# Patient Record
Sex: FEMALE | Race: WHITE | Employment: FULL TIME | URBAN - METROPOLITAN AREA
[De-identification: names, ages, dates, MRNs, and addresses within clinical notes are randomized per-mention and may not be internally consistent; named-entity substitution may affect disease eponyms.]

---

## 2022-11-14 ENCOUNTER — OFFICE VISIT (OUTPATIENT)
Dept: FAMILY MEDICINE CLINIC | Age: 22
End: 2022-11-14

## 2022-11-14 VITALS
SYSTOLIC BLOOD PRESSURE: 118 MMHG | DIASTOLIC BLOOD PRESSURE: 70 MMHG | OXYGEN SATURATION: 99 % | HEIGHT: 62 IN | HEART RATE: 100 BPM | TEMPERATURE: 97.1 F | BODY MASS INDEX: 36.95 KG/M2 | WEIGHT: 200.8 LBS

## 2022-11-14 DIAGNOSIS — R11.2 NAUSEA AND VOMITING, UNSPECIFIED VOMITING TYPE: Primary | ICD-10-CM

## 2022-11-14 DIAGNOSIS — L20.9 ATOPIC DERMATITIS, UNSPECIFIED TYPE: ICD-10-CM

## 2022-11-14 PROCEDURE — 99213 OFFICE O/P EST LOW 20 MIN: CPT

## 2022-11-14 RX ORDER — ONDANSETRON 4 MG/1
4 TABLET, FILM COATED ORAL 3 TIMES DAILY PRN
Qty: 15 TABLET | Refills: 0 | Status: SHIPPED | OUTPATIENT
Start: 2022-11-14

## 2022-11-14 SDOH — ECONOMIC STABILITY: FOOD INSECURITY: WITHIN THE PAST 12 MONTHS, YOU WORRIED THAT YOUR FOOD WOULD RUN OUT BEFORE YOU GOT MONEY TO BUY MORE.: NEVER TRUE

## 2022-11-14 SDOH — ECONOMIC STABILITY: FOOD INSECURITY: WITHIN THE PAST 12 MONTHS, THE FOOD YOU BOUGHT JUST DIDN'T LAST AND YOU DIDN'T HAVE MONEY TO GET MORE.: NEVER TRUE

## 2022-11-14 ASSESSMENT — PATIENT HEALTH QUESTIONNAIRE - PHQ9
10. IF YOU CHECKED OFF ANY PROBLEMS, HOW DIFFICULT HAVE THESE PROBLEMS MADE IT FOR YOU TO DO YOUR WORK, TAKE CARE OF THINGS AT HOME, OR GET ALONG WITH OTHER PEOPLE: 0
SUM OF ALL RESPONSES TO PHQ QUESTIONS 1-9: 0
2. FEELING DOWN, DEPRESSED OR HOPELESS: 0
SUM OF ALL RESPONSES TO PHQ9 QUESTIONS 1 & 2: 0
3. TROUBLE FALLING OR STAYING ASLEEP: 0
4. FEELING TIRED OR HAVING LITTLE ENERGY: 0
8. MOVING OR SPEAKING SO SLOWLY THAT OTHER PEOPLE COULD HAVE NOTICED. OR THE OPPOSITE, BEING SO FIGETY OR RESTLESS THAT YOU HAVE BEEN MOVING AROUND A LOT MORE THAN USUAL: 0
SUM OF ALL RESPONSES TO PHQ QUESTIONS 1-9: 0
7. TROUBLE CONCENTRATING ON THINGS, SUCH AS READING THE NEWSPAPER OR WATCHING TELEVISION: 0
5. POOR APPETITE OR OVEREATING: 0
9. THOUGHTS THAT YOU WOULD BE BETTER OFF DEAD, OR OF HURTING YOURSELF: 0
1. LITTLE INTEREST OR PLEASURE IN DOING THINGS: 0
6. FEELING BAD ABOUT YOURSELF - OR THAT YOU ARE A FAILURE OR HAVE LET YOURSELF OR YOUR FAMILY DOWN: 0

## 2022-11-14 ASSESSMENT — ENCOUNTER SYMPTOMS
VOMITING: 1
BLOOD IN STOOL: 0
DIARRHEA: 1
COUGH: 0
CHEST TIGHTNESS: 0
SORE THROAT: 0
FACIAL SWELLING: 0
WHEEZING: 0
ABDOMINAL PAIN: 1
SHORTNESS OF BREATH: 0
NAUSEA: 1

## 2022-11-14 ASSESSMENT — SOCIAL DETERMINANTS OF HEALTH (SDOH): HOW HARD IS IT FOR YOU TO PAY FOR THE VERY BASICS LIKE FOOD, HOUSING, MEDICAL CARE, AND HEATING?: NOT HARD AT ALL

## 2022-11-14 NOTE — PROGRESS NOTES
Subjective  Estrellita Fox, 23 y.o. female presents today with:  Chief Complaint   Patient presents with    Nausea & Vomiting     Pt states since yesterday pt has been throwing up has been feeling dizzy, nauseas, body aches    Rash     Pt states has been having a rash x3 weeks on her chest going to her back pt states rash is itchy on and off       Nausea & Vomiting  This is a new problem. Episode onset: Vomiting and diarrhea began yesterday. The problem occurs intermittently. The problem has been waxing and waning. Associated symptoms include abdominal pain, nausea, a rash and vomiting. Pertinent negatives include no arthralgias, chest pain, chills, congestion, coughing, diaphoresis, fatigue, fever, headaches, myalgias, neck pain, sore throat or weakness. Nothing aggravates the symptoms. She has tried rest and drinking for the symptoms. The treatment provided no relief. Rash  This is a new problem. The current episode started 1 to 4 weeks ago (Symptoms began 3 weeks ago. ). The problem has been waxing and waning since onset. The affected locations include the back, chest and neck. The rash is characterized by itchiness, redness and scaling. Associated with: possible mold exposure in apartment. Associated symptoms include diarrhea and vomiting. Pertinent negatives include no congestion, cough, fatigue, fever, shortness of breath or sore throat. Treatments tried: calamine. The treatment provided no relief. There is no history of allergies, asthma or eczema. Rash began on neck and has spread to chest and upper back. Review of Systems   Constitutional:  Negative for chills, diaphoresis, fatigue and fever. HENT:  Negative for congestion, facial swelling and sore throat. Respiratory:  Negative for cough, chest tightness, shortness of breath and wheezing. Cardiovascular:  Positive for palpitations (chronic). Negative for chest pain.    Gastrointestinal:  Positive for abdominal pain, diarrhea, nausea and vomiting. Negative for blood in stool. Musculoskeletal:  Negative for arthralgias, myalgias and neck pain. Skin:  Positive for rash. Negative for pallor and wound. Neurological:  Negative for weakness and headaches. PMH, Surgical Hx, Family Hx, and Social Hx reviewed and updated. Objective  Vitals:    11/14/22 1151 11/14/22 1237   BP: 118/70    Site: Right Upper Arm    Position: Sitting    Cuff Size: Large Adult    Pulse: (!) 122 100   Temp: 97.1 °F (36.2 °C)    TempSrc: Temporal    SpO2: 99%    Weight: 200 lb 12.8 oz (91.1 kg)    Height: 5' 2\" (1.575 m)      BP Readings from Last 3 Encounters:   11/14/22 118/70     Wt Readings from Last 3 Encounters:   11/14/22 200 lb 12.8 oz (91.1 kg)     Physical Exam  Vitals reviewed. Constitutional:       General: She is not in acute distress. Appearance: Normal appearance. HENT:      Head: Normocephalic and atraumatic. Eyes:      General: Lids are normal.   Cardiovascular:      Rate and Rhythm: Normal rate and regular rhythm. Pulses: Normal pulses. Heart sounds: Normal heart sounds. Pulmonary:      Effort: Pulmonary effort is normal. No respiratory distress. Breath sounds: Normal breath sounds and air entry. Abdominal:      General: Abdomen is flat. Bowel sounds are normal.      Palpations: Abdomen is soft. Tenderness: There is no abdominal tenderness. There is no guarding or rebound. Musculoskeletal:      Cervical back: Normal range of motion. Skin:     General: Skin is warm and dry. Findings: Erythema, lesion and rash present. No abrasion, abscess, acne, burn, ecchymosis or signs of injury. Rash is papular and scaling. Rash is not crusting or urticarial.             Comments: Diffuse erythematous rash with some areas of scaling. Sometimes pruritic. Neurological:      General: No focal deficit present. Mental Status: She is alert and oriented to person, place, and time. Mental status is at baseline. Psychiatric:         Mood and Affect: Mood normal.     Assessment & Plan   1. Nausea and vomiting, unspecified vomiting type  -     ondansetron (ZOFRAN) 4 MG tablet; Take 1 tablet by mouth 3 times daily as needed for Nausea or Vomiting, Disp-15 tablet, R-0Normal       -Hydration       -Eat small, bland meals as tolerated        -Discussed red flags for when to go to ER  2. Atopic dermatitis, unspecified type  -     hydrocortisone 2.5 % cream; Apply topically 2 times daily for 7 days Apply topically 2 times daily. , Topical, 2 TIMES DAILY Starting Mon 11/14/2022, Until Mon 11/21/2022, For 7 days, Disp-1 each, R-0, Normal  -Avoid scratching rash  -Use gentle soaps and detergent to avoid triggers  -Follow up with PCP for any signs of infection or if rash is worsening or not improving with treatment  -Discussed red flags for when to seek care in ER       No orders of the defined types were placed in this encounter. Orders Placed This Encounter   Medications    ondansetron (ZOFRAN) 4 MG tablet     Sig: Take 1 tablet by mouth 3 times daily as needed for Nausea or Vomiting     Dispense:  15 tablet     Refill:  0    hydrocortisone 2.5 % cream     Sig: Apply topically 2 times daily for 7 days Apply topically 2 times daily. Dispense:  1 each     Refill:  0     Return if symptoms worsen or fail to improve, for follow up with PCP. Reviewed with the patient: current clinical status,medications, activities and diet. Side effects, adverse effects of themedication prescribed today, as well as treatment plan/ rationale and result expectations have been discussed with the patient who expresses understanding and desires to proceed. Close follow up to evaluate treatment results and for coordination of care. I have reviewed the patient's medical history in detail and updated the computerized patient record.     DILCIA Mcdonnell - NP

## 2022-11-14 NOTE — LETTER
[address]  Phone: [phone]  Fax: [phone]      DILCIA Johnson NP    November 14, 2022     Patient: José Luis Zee   Date of Visit: 11/14/2022       To Whom it May Concern:    José Luis Zee was evaluated in my clinic today and may return to work when symptoms are improving and 24 hours fever free without the use of fever-reducing medication. If there are questions or concerns, please have the patient contact our office. Thank you for your assistance in this matter.       Sincerely,        Electronically signed by DILCIA Johnson NP on 11/14/2022 at 12:49 PM

## 2022-12-18 ENCOUNTER — OFFICE VISIT (OUTPATIENT)
Dept: FAMILY MEDICINE CLINIC | Age: 22
End: 2022-12-18

## 2022-12-18 VITALS
HEART RATE: 86 BPM | WEIGHT: 200.6 LBS | DIASTOLIC BLOOD PRESSURE: 72 MMHG | OXYGEN SATURATION: 98 % | SYSTOLIC BLOOD PRESSURE: 118 MMHG | TEMPERATURE: 96.9 F | HEIGHT: 62 IN | BODY MASS INDEX: 36.91 KG/M2

## 2022-12-18 DIAGNOSIS — S16.1XXA STRAIN OF NECK MUSCLE, INITIAL ENCOUNTER: Primary | ICD-10-CM

## 2022-12-18 RX ORDER — IBUPROFEN 600 MG/1
600 TABLET ORAL 3 TIMES DAILY PRN
Qty: 30 TABLET | Refills: 0 | Status: SHIPPED | OUTPATIENT
Start: 2022-12-18

## 2022-12-18 RX ORDER — CYCLOBENZAPRINE HCL 10 MG
10 TABLET ORAL 3 TIMES DAILY PRN
Qty: 30 TABLET | Refills: 0 | Status: SHIPPED | OUTPATIENT
Start: 2022-12-18 | End: 2022-12-28

## 2022-12-18 ASSESSMENT — ENCOUNTER SYMPTOMS
BACK PAIN: 0
RESPIRATORY NEGATIVE: 1
COLOR CHANGE: 0

## 2022-12-18 NOTE — LETTER
[address]  Phone: [phone]  Fax: [phone]    DILCIA De Paz CNP        December 18, 2022     Patient: Nadya Mejia   YOB: 2000   Date of Visit: 12/18/2022       To Whom It May Concern: It is my medical opinion that Nadya Mejia may return to work on 12/20/2022. If you have any questions or concerns, please don't hesitate to call.     Sincerely,        DILCIA De Paz CNP

## 2022-12-18 NOTE — PROGRESS NOTES
[facility] [address] Encounter        ASSESSMENT/PLAN     Adrian Mobley is a 23 y.o. female who presents with:  Neck pain and stiffness starting last night. Patient reports she cannot straighten head to normal position. Reports fall 2 days ago. She did not notice pain at the time of fall. On examination patient has limited range of motion to the left into the right. She denies cervical tenderness. There is no bruising abrasions on her head or neck. Neck is supple there are no masses detected. Reports warm heat applied to the neck last night was helpful in improving symptoms      1. Strain of neck muscle, initial encounter      Advised patient continue with applying warm heat to help relax muscle. Orders for Flexeril for muscle relaxation and ibuprofen for pain and inflammation. Advised patient to rest gradually increase range of motion as tolerated. PATIENT REFERRED TO:  Return if symptoms worsen or fail to improve. DISCHARGE MEDICATIONS:  New Prescriptions    CYCLOBENZAPRINE (FLEXERIL) 10 MG TABLET    Take 1 tablet by mouth 3 times daily as needed for Muscle spasms    IBUPROFEN (ADVIL;MOTRIN) 600 MG TABLET    Take 1 tablet by mouth 3 times daily as needed for Pain     Cannot display discharge medications since this is not an admission. Fernanda Ortiz, DILCIA - CNP    CHIEF COMPLAINT       Chief Complaint   Patient presents with    Neck Pain     Pt states since last neck was sore today worse painful to move          SUBJECTIVE/REVIEW OF SYSTEMS     Review of Systems   Constitutional:  Negative for chills, fatigue and fever. Respiratory: Negative. Cardiovascular: Negative. Genitourinary: Negative. Musculoskeletal:  Positive for arthralgias, myalgias, neck pain and neck stiffness. Negative for back pain. Skin:  Negative for color change and wound. Neurological:  Negative for weakness and numbness. Hematological:  Negative for adenopathy.  Does not bruise/bleed easily. Psychiatric/Behavioral:  Negative for agitation. OBJECTIVE/PHYSICAL EXAM     Physical Exam  Vitals reviewed. Constitutional:       General: She is not in acute distress. Appearance: Normal appearance. Cardiovascular:      Rate and Rhythm: Normal rate. Pulses: Normal pulses. Pulmonary:      Effort: Pulmonary effort is normal. No respiratory distress. Musculoskeletal:         General: Tenderness present. No swelling, deformity or signs of injury. Normal range of motion. Left shoulder: Tenderness present. No bony tenderness. Arms:    Skin:     General: Skin is warm and dry. Capillary Refill: Capillary refill takes less than 2 seconds. Findings: No bruising, erythema, lesion or rash. Neurological:      Mental Status: She is alert and oriented to person, place, and time. Mental status is at baseline. Sensory: No sensory deficit. Motor: No weakness. Psychiatric:         Mood and Affect: Mood normal.       VITALS  BP: 118/72, Temp: 96.9 °F (36.1 °C), Temp Source: Temporal, Heart Rate: 86,  , SpO2: 98 %      PAST MEDICAL HISTORY   History reviewed. No pertinent past medical history. SURGICAL HISTORY     Patient  has no past surgical history on file. CURRENT MEDICATIONS       Previous Medications    ONDANSETRON (ZOFRAN) 4 MG TABLET    Take 1 tablet by mouth 3 times daily as needed for Nausea or Vomiting     ALLERGIES     Patient is is allergic to sulfate. FAMILY HISTORY     Patient'sfamily history is not on file. HISTORY     Patient  reports that she has never smoked. She has never used smokeless tobacco. She reports that she does not drink alcohol and does not use drugs. READY CARE COURSE   No orders of the defined types were placed in this encounter. Labs:  No results found for this visit on 12/18/22. IMAGING:  No orders to display     Scheduled Meds:  Continuous Infusions:  PRN Meds:.

## 2022-12-18 NOTE — PATIENT INSTRUCTIONS
Use warm compress to help relax neck muscle. As tolerated light range of motion activities try to keep good alignment in your neck.   Gradually increase activity

## 2023-01-10 SDOH — HEALTH STABILITY: PHYSICAL HEALTH: ON AVERAGE, HOW MANY MINUTES DO YOU ENGAGE IN EXERCISE AT THIS LEVEL?: 120 MIN

## 2023-01-10 SDOH — HEALTH STABILITY: PHYSICAL HEALTH: ON AVERAGE, HOW MANY DAYS PER WEEK DO YOU ENGAGE IN MODERATE TO STRENUOUS EXERCISE (LIKE A BRISK WALK)?: 5 DAYS

## 2023-01-10 ASSESSMENT — SOCIAL DETERMINANTS OF HEALTH (SDOH)
WITHIN THE LAST YEAR, HAVE YOU BEEN HUMILIATED OR EMOTIONALLY ABUSED IN OTHER WAYS BY YOUR PARTNER OR EX-PARTNER?: NO
WITHIN THE LAST YEAR, HAVE YOU BEEN KICKED, HIT, SLAPPED, OR OTHERWISE PHYSICALLY HURT BY YOUR PARTNER OR EX-PARTNER?: NO
WITHIN THE LAST YEAR, HAVE YOU BEEN AFRAID OF YOUR PARTNER OR EX-PARTNER?: NO
WITHIN THE LAST YEAR, HAVE TO BEEN RAPED OR FORCED TO HAVE ANY KIND OF SEXUAL ACTIVITY BY YOUR PARTNER OR EX-PARTNER?: NO

## 2023-01-11 ENCOUNTER — OFFICE VISIT (OUTPATIENT)
Dept: FAMILY MEDICINE CLINIC | Age: 23
End: 2023-01-11

## 2023-01-11 VITALS
BODY MASS INDEX: 36.8 KG/M2 | OXYGEN SATURATION: 98 % | WEIGHT: 200 LBS | HEART RATE: 86 BPM | SYSTOLIC BLOOD PRESSURE: 96 MMHG | DIASTOLIC BLOOD PRESSURE: 72 MMHG | HEIGHT: 62 IN

## 2023-01-11 DIAGNOSIS — F43.10 PTSD (POST-TRAUMATIC STRESS DISORDER): ICD-10-CM

## 2023-01-11 DIAGNOSIS — Z00.00 WELL ADULT HEALTH CHECK: Primary | ICD-10-CM

## 2023-01-11 DIAGNOSIS — Z80.8 FAMILY HISTORY OF MELANOMA: ICD-10-CM

## 2023-01-11 ASSESSMENT — PATIENT HEALTH QUESTIONNAIRE - PHQ9
SUM OF ALL RESPONSES TO PHQ QUESTIONS 1-9: 0
2. FEELING DOWN, DEPRESSED OR HOPELESS: 0
1. LITTLE INTEREST OR PLEASURE IN DOING THINGS: 0
SUM OF ALL RESPONSES TO PHQ9 QUESTIONS 1 & 2: 0

## 2023-01-11 ASSESSMENT — ENCOUNTER SYMPTOMS
COUGH: 0
DIARRHEA: 0
CONSTIPATION: 0
SHORTNESS OF BREATH: 0

## 2023-01-11 NOTE — PROGRESS NOTES
Subjective  Chief Complaint   Patient presents with    Establish Care       HPI    Here to establish care  Has not had medical insurance for some time. Has hx of recurrent MRSA. Has some hx of palpitations at times. Seems to be doing better than it was. Had holter monitor about 3-4 yrs ago. Would like referral to a counselor    No other current concerns       There are no problems to display for this patient. Past Medical History:   Diagnosis Date    Anxiety     Depression      No past surgical history on file. Family History   Problem Relation Age of Onset    Cancer Mother         melanoma    Arthritis Mother     Cancer Maternal Aunt     Stroke Maternal Grandmother     Diabetes Maternal Grandmother     Cancer Maternal Grandmother         brain     Social History     Socioeconomic History    Marital status: Single     Spouse name: None    Number of children: None    Years of education: None    Highest education level: None   Tobacco Use    Smoking status: Never    Smokeless tobacco: Never   Substance and Sexual Activity    Alcohol use: Never    Drug use: Never     Social Determinants of Health     Financial Resource Strain: Low Risk     Difficulty of Paying Living Expenses: Not hard at all   Food Insecurity: No Food Insecurity    Worried About Running Out of Food in the Last Year: Never true    Ran Out of Food in the Last Year: Never true   Physical Activity: Sufficiently Active    Days of Exercise per Week: 5 days    Minutes of Exercise per Session: 120 min   Intimate Partner Violence: Not At Risk    Fear of Current or Ex-Partner: No    Emotionally Abused: No    Physically Abused: No    Sexually Abused: No     No current outpatient medications on file prior to visit. No current facility-administered medications on file prior to visit. Allergies   Allergen Reactions    Sulfa Antibiotics        Review of Systems   Constitutional:  Negative for fatigue.    Respiratory:  Negative for cough and shortness of breath. Cardiovascular:  Negative for chest pain. Gastrointestinal:  Negative for constipation and diarrhea. Psychiatric/Behavioral:  The patient is nervous/anxious. Objective  Vitals:    01/11/23 1019   BP: 96/72   Pulse: 86   SpO2: 98%   Weight: 200 lb (90.7 kg)   Height: 5' 2\" (1.575 m)     Physical Exam  Vitals and nursing note reviewed. Constitutional:       Appearance: Normal appearance. HENT:      Head: Normocephalic. Nose: Nose normal.      Mouth/Throat:      Mouth: Mucous membranes are moist.      Pharynx: Oropharynx is clear. Eyes:      Extraocular Movements: Extraocular movements intact. Conjunctiva/sclera: Conjunctivae normal.      Pupils: Pupils are equal, round, and reactive to light. Cardiovascular:      Rate and Rhythm: Normal rate and regular rhythm. Pulses: Normal pulses. Heart sounds: Normal heart sounds. Pulmonary:      Effort: Pulmonary effort is normal.      Breath sounds: Normal breath sounds. Musculoskeletal:      Cervical back: Neck supple. Skin:     General: Skin is warm. Neurological:      General: No focal deficit present. Mental Status: She is alert and oriented to person, place, and time. Mental status is at baseline. Psychiatric:         Mood and Affect: Mood normal.         Behavior: Behavior normal.         Thought Content: Thought content normal.         Judgment: Judgment normal.         Assessment & Plan     Diagnosis Orders   1. Well adult health check        2. PTSD (post-traumatic stress disorder)  [address]      3. Family history of melanoma  Discussed importance of getting annual skin check due to sig fam hx. Orders Placed This Encounter   Procedures    [facility], [facility]     Referral Priority:   Routine     Referral Type:   Eval and Treat     Referral Reason:   Specialty Services Required     Referred to Provider:   Jorge Luis Durant [facility]     Requested Specialty:   Licensed Clinical      Number of Visits Requested:   1       Side effects, adverse effects of the medication prescribed today, as well as treatment plan/ rationale and result expectations have been discussed with the patient who expresses understanding and desires to proceed. Close follow up to evaluate treatment results and for coordination of care. I have reviewed the patient's medical history in detail and updated the computerized patient record. As always, patient is advised that if symptoms worsen in any way they will proceed to the nearest emergency room. GAVINO jeann.     DILCIA Byrd - CNP

## 2024-05-22 ENCOUNTER — APPOINTMENT (OUTPATIENT)
Dept: PRIMARY CARE | Facility: CLINIC | Age: 24
End: 2024-05-22
Payer: COMMERCIAL

## 2024-06-29 ENCOUNTER — HOSPITAL ENCOUNTER (OUTPATIENT)
Dept: RADIOLOGY | Facility: EXTERNAL LOCATION | Age: 24
Discharge: HOME | End: 2024-06-29
Payer: COMMERCIAL

## 2024-06-29 DIAGNOSIS — M25.511 RIGHT SHOULDER PAIN, UNSPECIFIED CHRONICITY: ICD-10-CM

## 2025-06-11 ENCOUNTER — OFFICE VISIT (OUTPATIENT)
Dept: URGENT CARE | Age: 25
End: 2025-06-11
Payer: COMMERCIAL

## 2025-06-11 ENCOUNTER — APPOINTMENT (OUTPATIENT)
Dept: RADIOLOGY | Facility: HOSPITAL | Age: 25
End: 2025-06-11
Payer: COMMERCIAL

## 2025-06-11 ENCOUNTER — APPOINTMENT (OUTPATIENT)
Dept: CARDIOLOGY | Facility: HOSPITAL | Age: 25
End: 2025-06-11
Payer: COMMERCIAL

## 2025-06-11 ENCOUNTER — HOSPITAL ENCOUNTER (EMERGENCY)
Facility: HOSPITAL | Age: 25
Discharge: HOME | End: 2025-06-11
Payer: COMMERCIAL

## 2025-06-11 VITALS
TEMPERATURE: 97.7 F | SYSTOLIC BLOOD PRESSURE: 153 MMHG | RESPIRATION RATE: 16 BRPM | BODY MASS INDEX: 34.04 KG/M2 | OXYGEN SATURATION: 98 % | HEART RATE: 111 BPM | WEIGHT: 185 LBS | DIASTOLIC BLOOD PRESSURE: 96 MMHG | HEIGHT: 62 IN

## 2025-06-11 VITALS
HEIGHT: 62 IN | RESPIRATION RATE: 15 BRPM | SYSTOLIC BLOOD PRESSURE: 125 MMHG | DIASTOLIC BLOOD PRESSURE: 80 MMHG | WEIGHT: 197.6 LBS | OXYGEN SATURATION: 100 % | TEMPERATURE: 97.9 F | BODY MASS INDEX: 36.36 KG/M2 | HEART RATE: 83 BPM

## 2025-06-11 DIAGNOSIS — R07.9 CHEST PAIN, UNSPECIFIED TYPE: ICD-10-CM

## 2025-06-11 DIAGNOSIS — R07.9 CHEST PAIN, UNSPECIFIED TYPE: Primary | ICD-10-CM

## 2025-06-11 DIAGNOSIS — R42 DIZZINESS: ICD-10-CM

## 2025-06-11 LAB
ALBUMIN SERPL BCP-MCNC: 4.4 G/DL (ref 3.4–5)
ALP SERPL-CCNC: 48 U/L (ref 33–110)
ALT SERPL W P-5'-P-CCNC: 14 U/L (ref 7–45)
ANION GAP SERPL CALCULATED.3IONS-SCNC: 12 MMOL/L (ref 10–20)
AST SERPL W P-5'-P-CCNC: 15 U/L (ref 9–39)
BASOPHILS # BLD AUTO: 0.07 X10*3/UL (ref 0–0.1)
BASOPHILS NFR BLD AUTO: 0.6 %
BILIRUB SERPL-MCNC: 0.3 MG/DL (ref 0–1.2)
BUN SERPL-MCNC: 12 MG/DL (ref 6–23)
CALCIUM SERPL-MCNC: 9.2 MG/DL (ref 8.6–10.3)
CARDIAC TROPONIN I PNL SERPL HS: <3 NG/L (ref 0–13)
CHLORIDE SERPL-SCNC: 104 MMOL/L (ref 98–107)
CO2 SERPL-SCNC: 27 MMOL/L (ref 21–32)
CREAT SERPL-MCNC: 0.68 MG/DL (ref 0.5–1.05)
EGFRCR SERPLBLD CKD-EPI 2021: >90 ML/MIN/1.73M*2
EOSINOPHIL # BLD AUTO: 0.11 X10*3/UL (ref 0–0.7)
EOSINOPHIL NFR BLD AUTO: 1 %
ERYTHROCYTE [DISTWIDTH] IN BLOOD BY AUTOMATED COUNT: 14.3 % (ref 11.5–14.5)
GLUCOSE SERPL-MCNC: 89 MG/DL (ref 74–99)
HCT VFR BLD AUTO: 37.6 % (ref 36–46)
HGB BLD-MCNC: 11.8 G/DL (ref 12–16)
IMM GRANULOCYTES # BLD AUTO: 0.03 X10*3/UL (ref 0–0.7)
IMM GRANULOCYTES NFR BLD AUTO: 0.3 % (ref 0–0.9)
LYMPHOCYTES # BLD AUTO: 2.28 X10*3/UL (ref 1.2–4.8)
LYMPHOCYTES NFR BLD AUTO: 20.9 %
MAGNESIUM SERPL-MCNC: 2.1 MG/DL (ref 1.6–2.4)
MCH RBC QN AUTO: 25.9 PG (ref 26–34)
MCHC RBC AUTO-ENTMCNC: 31.4 G/DL (ref 32–36)
MCV RBC AUTO: 83 FL (ref 80–100)
MONOCYTES # BLD AUTO: 0.9 X10*3/UL (ref 0.1–1)
MONOCYTES NFR BLD AUTO: 8.2 %
NEUTROPHILS # BLD AUTO: 7.54 X10*3/UL (ref 1.2–7.7)
NEUTROPHILS NFR BLD AUTO: 69 %
NRBC BLD-RTO: 0 /100 WBCS (ref 0–0)
PLATELET # BLD AUTO: 346 X10*3/UL (ref 150–450)
POTASSIUM SERPL-SCNC: 3.8 MMOL/L (ref 3.5–5.3)
PROT SERPL-MCNC: 7.3 G/DL (ref 6.4–8.2)
RBC # BLD AUTO: 4.56 X10*6/UL (ref 4–5.2)
SODIUM SERPL-SCNC: 139 MMOL/L (ref 136–145)
WBC # BLD AUTO: 10.9 X10*3/UL (ref 4.4–11.3)

## 2025-06-11 PROCEDURE — 96374 THER/PROPH/DIAG INJ IV PUSH: CPT

## 2025-06-11 PROCEDURE — 83735 ASSAY OF MAGNESIUM: CPT

## 2025-06-11 PROCEDURE — 80053 COMPREHEN METABOLIC PANEL: CPT

## 2025-06-11 PROCEDURE — 85025 COMPLETE CBC W/AUTO DIFF WBC: CPT

## 2025-06-11 PROCEDURE — 2500000004 HC RX 250 GENERAL PHARMACY W/ HCPCS (ALT 636 FOR OP/ED): Mod: JZ

## 2025-06-11 PROCEDURE — 93005 ELECTROCARDIOGRAM TRACING: CPT

## 2025-06-11 PROCEDURE — 71045 X-RAY EXAM CHEST 1 VIEW: CPT | Performed by: RADIOLOGY

## 2025-06-11 PROCEDURE — 84484 ASSAY OF TROPONIN QUANT: CPT

## 2025-06-11 PROCEDURE — 99285 EMERGENCY DEPT VISIT HI MDM: CPT | Mod: 25

## 2025-06-11 PROCEDURE — 71045 X-RAY EXAM CHEST 1 VIEW: CPT

## 2025-06-11 PROCEDURE — 36415 COLL VENOUS BLD VENIPUNCTURE: CPT

## 2025-06-11 RX ORDER — KETOROLAC TROMETHAMINE 15 MG/ML
15 INJECTION, SOLUTION INTRAMUSCULAR; INTRAVENOUS ONCE
Status: COMPLETED | OUTPATIENT
Start: 2025-06-11 | End: 2025-06-11

## 2025-06-11 RX ADMIN — KETOROLAC TROMETHAMINE 15 MG: 15 INJECTION, SOLUTION INTRAMUSCULAR; INTRAVENOUS at 20:51

## 2025-06-11 ASSESSMENT — PATIENT HEALTH QUESTIONNAIRE - PHQ9
2. FEELING DOWN, DEPRESSED OR HOPELESS: NOT AT ALL
1. LITTLE INTEREST OR PLEASURE IN DOING THINGS: NOT AT ALL
SUM OF ALL RESPONSES TO PHQ9 QUESTIONS 1 & 2: 0

## 2025-06-11 ASSESSMENT — PAIN SCALES - GENERAL
PAINLEVEL_OUTOF10: 4
PAINLEVEL_OUTOF10: 7
PAINLEVEL_OUTOF10: 2

## 2025-06-11 ASSESSMENT — HEART SCORE
RISK FACTORS: NO KNOWN RISK FACTORS
HISTORY: SLIGHTLY SUSPICIOUS
AGE: <45
ECG: NORMAL
TROPONIN: LESS THAN OR EQUAL TO NORMAL LIMIT
HEART SCORE: 0

## 2025-06-11 ASSESSMENT — PAIN DESCRIPTION - LOCATION: LOCATION: CHEST

## 2025-06-11 ASSESSMENT — PAIN DESCRIPTION - DESCRIPTORS: DESCRIPTORS: DULL

## 2025-06-11 ASSESSMENT — PAIN - FUNCTIONAL ASSESSMENT: PAIN_FUNCTIONAL_ASSESSMENT: 0-10

## 2025-06-11 NOTE — Clinical Note
Stefania Belcher was seen and treated in our emergency department on 6/11/2025.  She may return to work on 06/13/2025.       If you have any questions or concerns, please don't hesitate to call.      Madie Mancia PA-C

## 2025-06-11 NOTE — PROGRESS NOTES
"Subjective   Patient ID: Stefania Belcher is a 24 y.o. female. They present today with a chief complaint of Chest Pain (PT states she had severe LEFT sided chest pain that started this evening around 4pm while she was at work. PT states the pain was so severe, it made her fall down. PT states pain is sharp and then achey.).    History of Present Illness  See mdm       History provided by:  Patient   used: No        Past Medical History  Allergies as of 06/11/2025 - Reviewed 06/11/2025   Allergen Reaction Noted    Sulfa (sulfonamide antibiotics) Other 01/11/2023    Doxycycline monohydrate Rash 06/11/2025    Sulfamethoxazole-trimethoprim Rash 06/11/2025       Prescriptions Prior to Admission[1]     Medical History[2]    Surgical History[3]     reports that she has never smoked. She has never used smokeless tobacco. She reports current drug use. Drug: Marijuana. She reports that she does not drink alcohol.    Review of Systems  Review of Systems   All other systems reviewed and are negative.                                 Objective    Vitals:    06/11/25 1958   BP: (!) 153/96   Pulse: (!) 111   Resp: 16   Temp: 36.5 °C (97.7 °F)   TempSrc: Oral   SpO2: 98%   Weight: 83.9 kg (185 lb)   Height: 1.575 m (5' 2\")     Patient's last menstrual period was 05/21/2025 (approximate).    Physical Exam  Vitals and nursing note reviewed.   Constitutional:       General: She is not in acute distress.     Appearance: Normal appearance. She is normal weight. She is not toxic-appearing or diaphoretic.      Comments: Tearful    HENT:      Head: Normocephalic and atraumatic.      Mouth/Throat:      Mouth: Mucous membranes are moist.   Eyes:      General: No scleral icterus.        Right eye: No discharge.         Left eye: No discharge.      Extraocular Movements: Extraocular movements intact.      Conjunctiva/sclera: Conjunctivae normal.      Pupils: Pupils are equal, round, and reactive to light.   Cardiovascular: "      Rate and Rhythm: Regular rhythm. Tachycardia present.      Pulses: Normal pulses.      Heart sounds: Normal heart sounds. No murmur heard.     No friction rub. No gallop.   Pulmonary:      Effort: Pulmonary effort is normal. No respiratory distress.      Breath sounds: No wheezing, rhonchi or rales.   Abdominal:      General: Abdomen is flat.      Tenderness: There is no abdominal tenderness. There is no guarding or rebound.   Musculoskeletal:         General: Normal range of motion.      Cervical back: Normal range of motion and neck supple. No rigidity or tenderness.      Right lower leg: No edema.      Left lower leg: No edema.   Lymphadenopathy:      Cervical: No cervical adenopathy.   Skin:     General: Skin is warm and dry.      Capillary Refill: Capillary refill takes less than 2 seconds.      Coloration: Skin is not jaundiced or pale.      Findings: No rash.   Neurological:      General: No focal deficit present.      Mental Status: She is alert.      Gait: Gait normal.   Psychiatric:         Mood and Affect: Mood normal.         Behavior: Behavior normal.         Procedures    Point of Care Test & Imaging Results from this visit  Results for orders placed or performed in visit on 06/11/25   POCT fingerstick glucose manually resulted   Result Value Ref Range    POC Fingerstick Blood Glucose 113 (A) 70 - 100 mg/dl        Cardiology, Vascular, and Other Imaging  ECG 12 lead  Result Date: 6/12/2025  Normal sinus rhythm Normal ECG No previous ECGs available        Diagnostic study results (if any) were reviewed by Dianne Samuel PA-C.    Assessment/Plan   Allergies, medications, history, and pertinent labs/EKGs/Imaging reviewed by Dianne Samuel PA-C.     Medical Decision Making  24-year-old female presents with intermittent chest pain, nausea, dizziness, disorientation.  Symptoms began about 4:00pm while working at [employer].  She states the chest pain was sharp and stabbing to the center of her  chest.  It seemed to progressively get worse over time.  She did sit down and try to rest.  Pain was initially sharp although became aching.  She had associated nausea, dizziness and felt outside of herself.  No syncope.  No shortness of breath.  No recent illness, cough.  No fevers or chills.  She occasionally uses THC no tobacco use.  Denies chance of pregnancy.  Denies previous cardiopulmonary history.  States she thinks her grandmother has history of DVT or PE although was not positive and grandmother passed away when she was a child.  She does not take any daily medications.  Has not had similar pain in the past.  Reports history of anxiety however has never felt like this related to anxiety.  She was feeling well and enjoying work prior to sudden onset of symptoms.  Exam as above.  Cannot PERC clear due to tachycardia.  EKG without STEMI or arrhythmia.  Refer to  ED for further evaluation.  Patient refused EMS, does sign AMA.  Friend will drive her.  She is able to meaningfully discuss risks of traveling by private car and is A&Ox3.  I called [facility] ED provider regarding patient presentation.      Orders and Diagnoses  Diagnoses and all orders for this visit:  Chest pain, unspecified type  -     ECG 12 Lead  Dizziness  -     POCT fingerstick glucose manually resulted      Medical Admin Record      Patient disposition: ED - private car to [facility], signed AMA     Electronically signed by Dianne Samuel PA-C  8:05 PM           [1] (Not in a hospital admission)   [2] No past medical history on file.  [3] No past surgical history on file.

## 2025-06-12 LAB
ATRIAL RATE: 95 BPM
P AXIS: 39 DEGREES
P OFFSET: 192 MS
P ONSET: 145 MS
POC FINGERSTICK BLOOD GLUCOSE: 113 MG/DL (ref 70–100)
PR INTERVAL: 146 MS
Q ONSET: 218 MS
QRS COUNT: 15 BEATS
QRS DURATION: 88 MS
QT INTERVAL: 356 MS
QTC CALCULATION(BAZETT): 447 MS
QTC FREDERICIA: 414 MS
R AXIS: 32 DEGREES
T AXIS: 15 DEGREES
T OFFSET: 396 MS
VENTRICULAR RATE: 95 BPM

## 2025-06-12 NOTE — ED PROVIDER NOTES
HPI   Chief Complaint   Patient presents with    Chest Pain     C/o chest pain at 1600 while at work, sharp stabbing pain, denies sob, denies blood thinners, denies birth control or use of cigarettes and THC. Patient reports anxiety and depression but states that it did not feel like a panic attack        Patient is a 24-year-old female presenting to the emergency department for evaluation of chest pain.  Patient states she was at work around 1600 today when she got a sharp pain in the left side of her chest.  She states it brought her to the ground.  She states this has happened before and they told her that it is anxiety however this felt different.  She states symptoms are slowly starting to improve at this time.  She reports taking an aspirin approximately 2 hours ago.  She went to urgent care and they recommended she come to the emergency department to be further evaluated.  She denies any shortness of breath, fevers, chills, nausea, vomiting abdominal pain, recent travel or sick contacts.  She is not on any birth control.  She denies any history of PE/DVT.  Denies any cardiac history.              Patient History   Medical History[1]  Surgical History[2]  Family History[3]  Social History[4]    Physical Exam   ED Triage Vitals [06/11/25 2034]   Temperature Heart Rate Respirations BP   36.5 °C (97.7 °F) 86 16 130/77      Pulse Ox Temp src Heart Rate Source Patient Position   100 % -- -- --      BP Location FiO2 (%)     -- --       Physical Exam  Vitals and nursing note reviewed.   Constitutional:       General: She is not in acute distress.     Appearance: She is well-developed. She is not ill-appearing or toxic-appearing.   HENT:      Head: Atraumatic.   Eyes:      Pupils: Pupils are equal, round, and reactive to light.   Cardiovascular:      Rate and Rhythm: Normal rate and regular rhythm.      Pulses:           Radial pulses are 2+ on the right side and 2+ on the left side.      Heart sounds: Normal heart  sounds.   Pulmonary:      Effort: Pulmonary effort is normal.      Breath sounds: Normal breath sounds. No decreased breath sounds, wheezing, rhonchi or rales.   Abdominal:      Palpations: Abdomen is soft.      Tenderness: There is no abdominal tenderness.   Musculoskeletal:      Cervical back: Normal range of motion.      Right lower leg: No edema.      Left lower leg: No edema.   Skin:     General: Skin is warm and dry.      Capillary Refill: Capillary refill takes less than 2 seconds.   Neurological:      General: No focal deficit present.      Mental Status: She is alert and oriented to person, place, and time.   Psychiatric:         Mood and Affect: Mood normal.         Behavior: Behavior normal.           ED Course & MDM   Diagnoses as of 06/11/25 2221   Chest pain, unspecified type                 No data recorded     Aniket Coma Scale Score: 15 (06/11/25 2032 : Senait Lovett RN) HEART Score: 0 (06/11/25 2220 : Madie Mancia PA-C)                         Medical Decision Making  **Disclaimer parts of this chart have been completed using voice recognition software. Please excuse any errors of transcription.     Evaluated this patient independently and my supervising physician was available for consultation.    HPI: Detailed above.    Exam: A medically appropriate exam performed, outlined above, given the known history and presentation.    History obtained from: Patient    EKG: Reviewed by myself.  Reviewed and interpreted by my attending physician.    Labs/Diagnostics:  Labs Reviewed   CBC WITH AUTO DIFFERENTIAL - Abnormal       Result Value    WBC 10.9      nRBC 0.0      RBC 4.56      Hemoglobin 11.8 (*)     Hematocrit 37.6      MCV 83      MCH 25.9 (*)     MCHC 31.4 (*)     RDW 14.3      Platelets 346      Neutrophils % 69.0      Immature Granulocytes %, Automated 0.3      Lymphocytes % 20.9      Monocytes % 8.2      Eosinophils % 1.0      Basophils % 0.6      Neutrophils Absolute 7.54      Immature  Granulocytes Absolute, Automated 0.03      Lymphocytes Absolute 2.28      Monocytes Absolute 0.90      Eosinophils Absolute 0.11      Basophils Absolute 0.07     COMPREHENSIVE METABOLIC PANEL - Normal    Glucose 89      Sodium 139      Potassium 3.8      Chloride 104      Bicarbonate 27      Anion Gap 12      Urea Nitrogen 12      Creatinine 0.68      eGFR >90      Calcium 9.2      Albumin 4.4      Alkaline Phosphatase 48      Total Protein 7.3      AST 15      Bilirubin, Total 0.3      ALT 14     MAGNESIUM - Normal    Magnesium 2.10     SERIAL TROPONIN-INITIAL - Normal    Troponin I, High Sensitivity <3      Narrative:     Less than 99th percentile of normal range cutoff-  Female and children under 18 years old <14 ng/L; Male <21 ng/L: Negative  Repeat testing should be performed if clinically indicated.     Female and children under 18 years old 14-50 ng/L; Male 21-50 ng/L:  Consistent with possible cardiac damage and possible increased clinical   risk. Serial measurements may help to assess extent of myocardial damage.     >50 ng/L: Consistent with cardiac damage, increased clinical risk and  myocardial infarction. Serial measurements may help assess extent of   myocardial damage.      NOTE: Children less than 1 year old may have higher baseline troponin   levels and results should be interpreted in conjunction with the overall   clinical context.     NOTE: Troponin I testing is performed using a different   testing methodology at [facility] than at other   [facility]. Direct result comparisons should only   be made within the same method.   TROPONIN SERIES- (INITIAL, 1 HR)    Narrative:     The following orders were created for panel order Troponin I Series, High Sensitivity (0, 1 HR).  Procedure                               Abnormality         Status                     ---------                               -----------         ------                     Troponin I, High  "Sensiti...[[id number]]  Normal              Final result               Troponin, High Sensitivi...[[id number]]                                                   Please view results for these tests on the individual orders.   SERIAL TROPONIN, 1 HOUR     XR chest 1 view   Final Result   1.  No evidence of acute cardiopulmonary process.                  MACRO:   None        Signed by: Jose Luis Page 6/11/2025 10:10 PM   Dictation workstation:   [id number]        EMERGENCY DEPARTMENT COURSE and DIFFERENTIAL DIAGNOSIS/MDM:  Patient is a 24-year-old female presenting to the emergency department for evaluation of chest pain.  On physical exam vital signs stable patient is in no acute distress.  Physical exam benign.  PERC negative.  Diagnostic labs ordered as well as IV Toradol for pain and chest x-ray.  Troponin less than 3 and patient no longer having chest pain and no cardiac risk factors therefore low suspicion for ACS.  CMP showed no electrolyte normalities.  Magnesium normal.  CBC showed no leukocytosis or anemia.  Chest x-ray showed no acute cardiopulmonary process with no pneumonia, pneumothorax, cardiomegaly.  At this time low suspicion for cardiac etiology and suspect this is likely muscle spasm, GERD, anxiety.  Patient asymptomatic and feels comfortable being discharged at this time.  She was discharged in stable condition.  She will return to the emergency department with any new or worsening symptoms.  Return precautions discussed.    The patient presented with a chief complaint of chest pain. The differential diagnosis associated with this patient's presentation includes anxiety, GERD, ACS, electrolyte abnormalities, muscle spasm.     Vitals:    Vitals:    06/11/25 2034   BP: 130/77   Pulse: 86   Resp: 16   Temp: 36.5 °C (97.7 °F)   SpO2: 100%   Weight: 89.6 kg (197 lb 9.6 oz)   Height: 1.575 m (5' 2\")     History Limited by:    None    Independent history obtained from:    None    External records " reviewed:    Outpatient Note urgent care note from today    Diagnostics interpreted by me:    Xrays - see my independent interpretation in [facility]    Discussions with other clinicians:    None    Chronic conditions impacting care:    None    Social determinants of health affecting care:    None    Diagnostic tests considered but not performed: None    ED Medications managed:    Medications   ketorolac (Toradol) injection 15 mg (15 mg intravenous Given 6/11/25 2051)       Prescription drugs considered:    None    Screenings:     HEART Score: 0          Procedure  Procedures         [1] No past medical history on file.  [2] No past surgical history on file.  [3] No family history on file.  [4]   Social History  Tobacco Use    Smoking status: Never    Smokeless tobacco: Never   Vaping Use    Vaping status: Never Used   Substance Use Topics    Alcohol use: Never    Drug use: Yes     Types: Marijuana        Madie Mancia PA-C  06/11/25 [id number]

## 2025-06-12 NOTE — DISCHARGE INSTRUCTIONS
Thank you for choosing [facility] and [facility] [place] today. Please follow up as indicated as continued care and treatment is a very important part of your care today. Please return to this or any Emergency Department if you have any new or worsening symptoms.

## 2025-06-16 LAB
ATRIAL RATE: 95 BPM
P AXIS: 39 DEGREES
P OFFSET: 192 MS
P ONSET: 145 MS
PR INTERVAL: 146 MS
Q ONSET: 218 MS
QRS COUNT: 15 BEATS
QRS DURATION: 88 MS
QT INTERVAL: 356 MS
QTC CALCULATION(BAZETT): 447 MS
QTC FREDERICIA: 414 MS
R AXIS: 32 DEGREES
T AXIS: 15 DEGREES
T OFFSET: 396 MS
VENTRICULAR RATE: 95 BPM